# Patient Record
Sex: MALE | Race: WHITE
[De-identification: names, ages, dates, MRNs, and addresses within clinical notes are randomized per-mention and may not be internally consistent; named-entity substitution may affect disease eponyms.]

---

## 2017-01-26 ENCOUNTER — HOSPITAL ENCOUNTER (OUTPATIENT)
Dept: HOSPITAL 39 - CT | Age: 82
End: 2017-01-26
Attending: FAMILY MEDICINE
Payer: MEDICARE

## 2017-01-26 DIAGNOSIS — S20.211A: ICD-10-CM

## 2017-01-26 DIAGNOSIS — S22.41XA: ICD-10-CM

## 2017-01-26 DIAGNOSIS — N20.0: ICD-10-CM

## 2017-01-26 DIAGNOSIS — I70.90: ICD-10-CM

## 2017-01-26 DIAGNOSIS — I51.7: ICD-10-CM

## 2017-01-26 DIAGNOSIS — I31.3: ICD-10-CM

## 2017-01-26 DIAGNOSIS — S06.0X0A: Primary | ICD-10-CM

## 2017-01-26 NOTE — CT
Study: CT Chest.



Indication: CONTUSION RT FRONTAL WALL OF THORAX



Technique: CT imaging of the chest obtained without intravenous

administration of contrast.



Comparison: None.



Findings:



Atherosclerosis great vessels, aorta, coronary arteries. Moderate

cardiomegaly. Small pericardial effusion.  No pathologically enlarged

mediastinal or hilar lymphadenopathy in the absence of intravenous

contrast.



Anterior right 2nd through 10th rib fractures noted with changes most

pronounced at the 5th through 7th rib fractures where there is

significant comminution and buckling. Overlying the site there is

subcutaneous edema/ inflammation and mild chest wall hematoma. In

addition, there is minimal emphysema within the anterior chest wall

musculature at this site.



No consolidation, pleural effusion or pneumothorax. Mild bibasilar

atelectasis. Minimal indeterminate fluid noted anterior to the right

renal vein. Partial visualization of a 3 mm central nonobstructing right

renal calculus. Left kidney not visualized.



Impression:



Multiple right anterior rib fractures as above with associated soft

tissue injury of the right chest wall.



No pulmonary contusion or pneumothorax.



Atherosclerosis.



Cardiomegaly with a small pericardial effusion. Echocardiography could

better evaluate as clinically indicated.



Minimal indeterminate fluid along the anterior margin right renal vein.



Partial visualization 3 mm right renal stone.



Electronically signed by: Renny Davis MD 01/26/2017 12:38

## 2017-01-26 NOTE — CT
Study:  CT of the Head.



Indication: HEAD INJURY



Technique:  Axial CT images of the head were acquired without intravenous

contrast.



Comparison: None.



Findings:



No CT evidence of acute ischemia, acute hemorrhage, mass, mass effect,

midline shift, or extra-axial fluid collection.



Ventricles are normal in configuration without hydrocephalus.



 Patchy hypoattenuation of the periventricular and subcortical white

matter noted. This is nonspecific but most consistent with chronic

microvascular ischemic change. Global parenchymal volume loss and

intracranial atherosclerosis noted as well.



Paranasal sinuses are adequately aerated.



Mastoid air cells are adequately aerated.



Osseous structures and soft tissues are unremarkable.



Impression:

1. No CT evidence of acute intracranial abnormality.

2. Senescent changes.





Electronically signed by: Renny Davis MD 01/26/2017 12:33

## 2017-04-18 ENCOUNTER — HOSPITAL ENCOUNTER (OUTPATIENT)
Dept: HOSPITAL 39 - US | Age: 82
Discharge: HOME | End: 2017-04-18
Attending: FAMILY MEDICINE
Payer: MEDICARE

## 2017-04-18 DIAGNOSIS — M79.604: Primary | ICD-10-CM

## 2017-04-19 NOTE — US
EXAM DESCRIPTION: 



Venous,Lower Extremity RT



CLINICAL HISTORY: 



81 years, Male, PAIN IN RIGHT LEG



COMPARISON: 



None



TECHNIQUE: 



Duplex venous ultrasound of the right lower extremity was

performed.



FINDINGS:

 

The right  lower extremity veins are fully compressible and

demonstrate physiologic responses to Valsalva maneuvers. Color

Doppler images show no intraluminal filling defect.



IMPRESSION: 



Negative exam. No evidence of DVT in the right lower extremity.



Electronically signed by:  Harsh Thorne MD  4/19/2017 7:50 AM CDT

## 2017-05-02 ENCOUNTER — HOSPITAL ENCOUNTER (OUTPATIENT)
Dept: HOSPITAL 39 - GMAB | Age: 82
Discharge: HOME | End: 2017-05-02
Attending: FAMILY MEDICINE
Payer: MEDICARE

## 2017-05-02 DIAGNOSIS — I10: ICD-10-CM

## 2017-05-02 DIAGNOSIS — Z12.5: Primary | ICD-10-CM

## 2017-05-02 PROCEDURE — 84443 ASSAY THYROID STIM HORMONE: CPT

## 2017-07-11 ENCOUNTER — HOSPITAL ENCOUNTER (OUTPATIENT)
Dept: HOSPITAL 39 - GMAB | Age: 82
Discharge: HOME | End: 2017-07-11
Attending: FAMILY MEDICINE
Payer: MEDICARE

## 2017-07-11 DIAGNOSIS — R41.9: Primary | ICD-10-CM

## 2017-07-11 DIAGNOSIS — R41.81: ICD-10-CM

## 2017-07-14 ENCOUNTER — HOSPITAL ENCOUNTER (OUTPATIENT)
Dept: HOSPITAL 39 - MRI | Age: 82
End: 2017-07-14
Attending: FAMILY MEDICINE
Payer: MEDICARE

## 2017-07-14 DIAGNOSIS — R41.81: Primary | ICD-10-CM

## 2017-07-14 DIAGNOSIS — R41.9: ICD-10-CM

## 2017-09-02 ENCOUNTER — HOSPITAL ENCOUNTER (EMERGENCY)
Dept: HOSPITAL 39 - ER | Age: 82
Discharge: HOME | End: 2017-09-02
Payer: MEDICARE

## 2017-09-02 VITALS — TEMPERATURE: 98.7 F | OXYGEN SATURATION: 99 %

## 2017-09-02 VITALS — SYSTOLIC BLOOD PRESSURE: 116 MMHG | DIASTOLIC BLOOD PRESSURE: 74 MMHG

## 2017-09-02 DIAGNOSIS — Z79.01: ICD-10-CM

## 2017-09-02 DIAGNOSIS — K21.9: ICD-10-CM

## 2017-09-02 DIAGNOSIS — S61.511A: Primary | ICD-10-CM

## 2017-09-02 DIAGNOSIS — I10: ICD-10-CM
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
## 2017-09-02 NOTE — ED.PDOC
History of Present Illness





- General


Chief Complaint: Laceration


Stated Complaint: cut R wrist


Time Seen by Provider: 17 13:31


Source: patient, RN notes reviewed, Vital Signs reviewed


Exam Limitations: no limitations





- History of Present Illness


Initial Comments: 





Patient comes in with a cut on his right wrist. He was attempting to cut wild 

flowers for his wife and accidentally cut his wrist. He is on Warfarin and thus 

had a lot of bleeding. No numbness or tingling distally. Moves all fingers.


Timing/Duration: just prior to arrival


Severity: moderate


Location: extremities - Right volar wrist


Improving Factors: nothing


Worsening Factors: nothing


Associated Symptoms: denies symptoms


Allergies/Adverse Reactions: 


Allergies





NO KNOWN ALLERGY Allergy (Unverified 04/09/15 20:31)


 








Home Medications: 


Ambulatory Orders





Finasteride 5 mg PO DAILY 04/09/15 


Meloxicam 7.5 mg PO DAILY 04/09/15 


Metoprolol Tartrate 50 PO DAILY 04/09/15 


Protonix 40 mg PO DAILY 04/09/15 


Simvastatin 40 mg PO DAILY 04/09/15 


Sulfamethoxazole-Trimethoprim [Bactrim Ds 800-160 mg] 1 tab PO BID #10 tab 04/09

/15 


Tamsulosin Hcl 0.4 mg PO DAILY 04/09/15 











Review of Systems





- Review of Systems


Constitutional: States: no symptoms reported


Respiratory: States: no symptoms reported


Cardiology: States: no symptoms reported


Musculoskeletal: States: no symptoms reported


Skin: States: see HPI


Neurological: States: no symptoms reported.  Denies: numbness, paresthesia, 

tingling, weakness


Hematologic/Lymphatic: States: easy bleeding


All other Systems: No Change from Baseline





Past Medical History (General)





- Patient Medical History


Hx Hypertension: Yes


Hx Gastroesophageal Reflux: Yes


Hx Cancer: No





- Vaccination History


Hx Tetanus, Diphtheria Vaccination: No


Hx Influenza Vaccination: Yes


Hx Pneumococcal Vaccination: No





- Social History


Hx Tobacco Use: No


Hx Chewing Tobacco Use: No


Hx Alcohol Use: No


Hx Substance Use: No


Hx Substance Use Treatment: No


Hx Depression: No


Hx Physical Abuse: No


Hx Emotional Abuse: No


Hx Suspected Abuse: No





- Female History


Patient Pregnant: No





Family Medical History





- Family History


  ** Mother


Living Status: 


Hx Family Cancer: Yes





Physical Exam





- Physical Exam


General Appearance: Alert, Comfortable, No apparent distress, Well Developed, 

Well Groomed, Well Hydrated, Well Nourished


Respiratory: no respiratory distress


Extremity: normal range of motion, no pedal edema, normal capillary refill


Neurologic: no motor/sensory deficits - L hand - normal sensation to light touch

, FROM all fingers, alert, normal mood/affect, oriented x 3


Skin Exam: warm/dry, normal color


Skin Problem Location: upper extremities - R volar wrist


Skin Character: other - ~5cm laceration on volar aspect of R wrist. Tendon is 

visable but no injury. Moderate bleeding


Comments: 





 Vital Signs











  17





  12:26


 


Temperature 98.7 F


 


Pulse Rate [ 74





Left Radial] 


 


Respiratory 18





Rate 


 


Blood Pressure 110/72





[Left Arm] 


 


O2 Sat by Pulse 99





Oximetry 














Procedures





- Laceration/Wound Repair


  ** Right Volar Wrist


Wound Length (cm): 5


Wound's Depth, Shape: superficial, linear


Wound Explored: no foreign body removed


Irrigated w/ Saline (cc's): 250


Betadine Prep?: Yes - then scrubbed with Hibiclens and saline


Anesthesia: 1% Lidocaine


Volume Anesthetic (cc's): 3


Wound Debrided: minimal


Wound Repaired With: sutures


Suture Size/Type: 4:0, prolene


Number of Sutures: 8


Layer Closure?: No


Sterile Dressing Applied?: Yes - Pressure dressing with Neosporin


Splint Applied?: No


Sling Applied?: No





Departure





- Departure


Clinical Impression: 


Laceration of right wrist without complication


Qualifiers:


 Encounter type: initial encounter Qualified Code(s): S61.511A - Laceration 

without foreign body of right wrist, initial encounter





Time of Disposition: 13:38


Disposition: Discharge to Home or Self Care


Condition: Good


Departure Forms:  ED Discharge - Pt. Copy, Patient Portal Self Enrollment


Instructions:  DI for Laceration Repair -- Simple


Diet: resume usual diet


Activity: increase activity as tolerated


Referrals: 


Abdirizak Gurrola MD [Primary Care Provider] - 1-2 Weeks


Home Medications: 


Ambulatory Orders





Finasteride 5 mg PO DAILY 04/09/15 


Meloxicam 7.5 mg PO DAILY 04/09/15 


Metoprolol Tartrate 50 PO DAILY 04/09/15 


Protonix 40 mg PO DAILY 04/09/15 


Simvastatin 40 mg PO DAILY 04/09/15 


Sulfamethoxazole-Trimethoprim [Bactrim Ds 800-160 mg] 1 tab PO BID #10 tab 04/09

/15 


Tamsulosin Hcl 0.4 mg PO DAILY 04/09/15 








Additional Instructions: 


Suture removal in 7-10 days

## 2017-11-29 ENCOUNTER — HOSPITAL ENCOUNTER (OUTPATIENT)
Dept: HOSPITAL 39 - GMAB | Age: 82
Discharge: HOME | End: 2017-11-29
Attending: FAMILY MEDICINE
Payer: MEDICARE

## 2017-11-29 DIAGNOSIS — M48.07: Primary | ICD-10-CM

## 2017-11-29 DIAGNOSIS — R42: ICD-10-CM

## 2017-12-01 ENCOUNTER — HOSPITAL ENCOUNTER (EMERGENCY)
Dept: HOSPITAL 39 - ER | Age: 82
Discharge: HOME | End: 2017-12-01
Payer: MEDICARE

## 2017-12-01 VITALS — SYSTOLIC BLOOD PRESSURE: 118 MMHG | DIASTOLIC BLOOD PRESSURE: 60 MMHG

## 2017-12-01 VITALS — TEMPERATURE: 98.1 F

## 2017-12-01 VITALS — OXYGEN SATURATION: 96 %

## 2017-12-01 DIAGNOSIS — I10: ICD-10-CM

## 2017-12-01 DIAGNOSIS — Z79.899: ICD-10-CM

## 2017-12-01 DIAGNOSIS — Q60.0: ICD-10-CM

## 2017-12-01 DIAGNOSIS — I48.91: ICD-10-CM

## 2017-12-01 DIAGNOSIS — K21.9: ICD-10-CM

## 2017-12-01 DIAGNOSIS — R42: Primary | ICD-10-CM

## 2017-12-01 DIAGNOSIS — Z79.01: ICD-10-CM

## 2017-12-01 NOTE — CT
EXAM DESCRIPTION: 

Head



CLINICAL HISTORY: 

slurred speech/dizzy



COMPARISON: 

January 26, 2017



TECHNIQUE: 

Noncontrast transaxial CT images of the head are obtained from

base to vertex. 

This exam was performed according to our departmental

dose-optimization program, which includes automated exposure

control, adjustment of the mA and/or kV according to patient size

and/or use of iterative reconstruction technique.



FINDINGS: 

The midline structures are not displaced. Sulci are

age-appropriate. There are areas of decreased attenuation in the

periventricular white matter and the white matter of the centrum

semiovale.

Focal area of decreased attenuation inferior to the left basal

ganglia could represent old lacunar infarct versus prominent

perivascular space.

There is no evidence of mass, mass-effect, hydrocephalus, or

acute intracranial hemorrhage. No abnormal extra axial fluid

collection is seen.

Bone windows show no evidence of depressed skull fracture.

Moderate calcifications of the intracranial carotid arteries is

seen.

The visualized paranasal sinuses are unremarkable.



IMPRESSION: 

1.  Age-appropriate atrophy with evidence of old small vessel

ischemic type changes seen.

2.  No acute abnormality is seen on noncontrast CT of the head.



Electronically signed by:  Zeus Gilliam MD  12/1/2017 11:54 AM [facility]

Workstation: [id number]

## 2017-12-01 NOTE — ED.PDOC
History of Present Illness





- General


Chief Complaint: Neuro Symptoms/Deficits


Stated Complaint: slurred speech,dizziness


Time Seen by Provider: 17 10:50


Source: patient


Exam Limitations: no limitations





- History of Present Illness


Initial Comments: 





Juan Francisco 82 y/o male stated he had on and off slurred speech for the last 

one week and occasional dizzy feeling denies any localized weakness on his 

extremities or facial palsy;has A.fib and had been on long term 

anticoagulation.No tinnitus ,wears hearing aids,no blurry vision,no nausea/

vomiting


Timing/Duration: changing over time, intermittent, other - 6 days


Severity: moderate


Improving Factors: nothing


Worsening Factors: nothing


Associated Symptoms: denies symptoms


Allergies/Adverse Reactions: 


Allergies





NO KNOWN ALLERGY Allergy (Unverified 04/09/15 20:31)


 








Home Medications: 


Ambulatory Orders





Celecoxib [Celebrex] 200 mg PO DAILY 17 


Clonazepam 0.5 mg PO PRN 17 


Donepezil Hydrochloride [Aricept] 10 mg PO BEDTIME 17 


Finasteride [Proscar] 5 mg PO DAILY 17 


Folic Acid-Vitamin B6-Vitamin [Folbee] 1 tab PO DAILY 17 


Glucosamine Hydrochloride [Glucosamine] 500 mg PO DAILY 17 


Memantine HCl [Namenda] 10 mg PO BID 17 


Metoprolol Succinate [Metoprolol Succinate ER] 100 mg PO DAILY 17 


Misc Natural Products [Saw [place]] 1 cap PO DAILY 17 


Multiple Vitamins W/ Minerals [Multivitamin Adults] 1 tab PO DAILY 17 


Pantoprazole Tablet [Protonix] 40 mg PO ACBK 17 


Pregabalin [Lyrica] 50 mg PO DAILY 17 


Sildenafil Citrate [Viagra] 50 mg PO PRN 17 


Simvastatin [Zocor] 20 mg PO DAILY 17 


Tamsulosin [Flomax] 0.4 mg PO BEDTIME 17 


Warfarin Sodium [Coumadin] 10 mg PO DAILY 17 


Zolpidem Tartrate [Ambien] 5 mg PO PRN 17 











Review of Systems





- Review of Systems


Constitutional: States: no symptoms reported


EENTM: States: no symptoms reported


Respiratory: States: no symptoms reported


Cardiology: States: no symptoms reported


Gastrointestinal/Abdominal: States: no symptoms reported


Genitourinary: States: no symptoms reported


Musculoskeletal: States: no symptoms reported


Neurological: States: see HPI





Past Medical History (General)





- Patient Medical History


Hx Stroke: No


Hx Cardiac Disorders: Yes - Atrial fib


Hx Congestive Heart Failure: No


Hx Hypertension: Yes


Hx Diabetes: No


Hx Gastroesophageal Reflux: Yes


Hx Cancer: No


Hx Other PMH: Yes - inborn solitary kidney


Surgical History: tonsillectomy, other - knee,back





- Vaccination History


Hx Tetanus, Diphtheria Vaccination: No


Hx Influenza Vaccination: Yes


Hx Pneumococcal Vaccination: Yes





- Social History


Hx Tobacco Use: No


Hx Chewing Tobacco Use: No


Hx Alcohol Use: No


Hx Substance Use: No


Hx Substance Use Treatment: No


Hx Depression: No


Hx Physical Abuse: No


Hx Emotional Abuse: No


Hx Suspected Abuse: No





- Activities of Daily Living


Grooming Ability: Independent


Eating (Feeding) Ability: Independent


Toileting Ability: Independent





- Female History


Patient Pregnant: No





Family Medical History





- Family History


  ** Mother


Living Status: 


Hx Family Asthma: Yes - copd-dad


Hx Family Stroke: Yes - mom-but  in her old age


Hx Family Cancer: Yes





Physical Exam





- Physical Exam


General Appearance: Alert, Comfortable, No apparent distress


Eye Exam: bilateral normal


Ears, Nose, Throat: hearing grossly normal, normal ENT inspection, normal 

pharynx


Neck: non-tender, full range of motion, supple, normal inspection


Respiratory: chest non-tender, lungs clear, normal breath sounds, no 

respiratory distress


Cardiovascular/Chest: normal peripheral pulses, no murmur, irregularly 

irregular - heart rate-65


Peripheral Pulses: radial,right: 2+, radial,left: 2+


Gastrointestinal/Abdominal: normal bowel sounds, non tender, soft, no 

organomegaly


Back Exam: no CVA tenderness, no vertebral tenderness


Extremity: normal range of motion, non-tender, no pedal edema, no calf 

tenderness


Neurologic: CNs II-XII nml as tested, no motor/sensory deficits, alert, 

oriented x 3, other - negative pronator drift;Romberg negative


Skin Exam: warm/dry


Lymphatic: no adenopathy





Progress





- Progress


Progress: 





17 12:17


 Last Vital Signs











Temp  98.1 F   17 10:47


 


Pulse  71   17 11:57


 


Resp  16   17 11:57


 


BP  119/68   17 11:57


 


Pulse Ox  96   17 11:57














- Results/Orders


Results/Orders: 





 Laboratory Tests











  17





  11:33 11:33 11:33


 


WBC  7.0  


 


RBC  4.67 L  


 


Hgb  13.1 L  


 


Hct  39.9 L  


 


MCV  85.3  


 


MCH  28.0  


 


MCHC  33.0  


 


RDW  16.2 H  


 


Plt Count  167  


 


MPV  7.8  


 


Absolute Neuts (auto)  5.00  


 


Absolute Lymphs (auto)  1.20  


 


Absolute Monos (auto)  0.60  


 


Absolute Eos (auto)  0.10  


 


Absolute Basos (auto)  0.10  


 


Neutrophils %  71.2  


 


Lymphocytes %  17.9 L  


 


Monocytes %  8.0  


 


Eosinophils %  2.0  


 


Basophils %  0.9  


 


PT   25.3 H* 


 


INR   2.260 


 


Sodium    137


 


Potassium    4.2


 


Chloride    104


 


Carbon Dioxide    26


 


Anion Gap    11.2 L


 


BUN    20 H


 


Creatinine    1.23


 


BUN/Creatinine Ratio    16.3


 


Random Glucose    93


 


Serum Osmolality    276.1


 


Calcium    9.1


 


Total Bilirubin    0.9


 


AST    28


 


ALT    27


 


Alkaline Phosphatase    54


 


Serum Total Protein    7.0


 


Albumin    4.2


 


Globulin    2.8


 


Albumin/Globulin Ratio    1.5














- EKG/XRAY/CT


EKG: Atrial, Fibrillation


Comments: heart rate -88


CT Ordered: Yes - head -no acute abnormality





Departure





- Departure


Clinical Impression: 


 Dizziness, nonspecific





Time of Disposition: 12:19


Disposition: Discharge to Home or Self Care


Condition: Fair


Departure Forms:  ED Discharge - Pt. Copy, Patient Portal Self Enrollment


Instructions:  DI for Stroke-Intracerebral Hemorrhage


Referrals: 


Abdirizak Gurrola MD [Primary Care Provider] - 1-2 Weeks


Home Medications: 


Ambulatory Orders





Celecoxib [Celebrex] 200 mg PO DAILY 17 


Clonazepam 0.5 mg PO PRN 17 


Donepezil Hydrochloride [Aricept] 10 mg PO BEDTIME 17 


Finasteride [Proscar] 5 mg PO DAILY 17 


Folic Acid-Vitamin B6-Vitamin [Folbee] 1 tab PO DAILY 17 


Glucosamine Hydrochloride [Glucosamine] 500 mg PO DAILY 17 


Memantine HCl [Namenda] 10 mg PO BID 17 


Metoprolol Succinate [Metoprolol Succinate ER] 100 mg PO DAILY 17 


Misc Natural Products [Saw [place]] 1 cap PO DAILY 17 


Multiple Vitamins W/ Minerals [Multivitamin Adults] 1 tab PO DAILY 17 


Pantoprazole Tablet [Protonix] 40 mg PO ACBK 17 


Pregabalin [Lyrica] 50 mg PO DAILY 17 


Sildenafil Citrate [Viagra] 50 mg PO PRN 17 


Simvastatin [Zocor] 20 mg PO DAILY 17 


Tamsulosin [Flomax] 0.4 mg PO BEDTIME 17 


Warfarin Sodium [Coumadin] 10 mg PO DAILY 17 


Zolpidem Tartrate [Ambien] 5 mg PO PRN 17 








Additional Instructions: 


RETURN TO EMERGENCY ROOM AS NEEDED;FOLLOW UP WITH primary md 2017 call 

for appointment if needed

## 2017-12-04 ENCOUNTER — HOSPITAL ENCOUNTER (OUTPATIENT)
Dept: HOSPITAL 39 - MRI | Age: 82
Discharge: HOME | End: 2017-12-04
Attending: FAMILY MEDICINE
Payer: MEDICARE

## 2017-12-04 DIAGNOSIS — M48.07: Primary | ICD-10-CM

## 2017-12-04 DIAGNOSIS — R55: ICD-10-CM

## 2017-12-04 NOTE — MRI
EXAM DESCRIPTION: 



Lumbar Spine w/o Contrast



CLINICAL HISTORY: 



81 years, Male, SPINAL STENOSIS- LUMBOSACRAL REGION



COMPARISON: 



None



TECHNIQUE: 



Multiplanar multi sequence images of the lumbar spine were

obtained without gadolinium contrast.



FINDINGS: 



Vertebral body height and alignment are well maintained. There

are Modic type I discogenic endplate signal changes at L1-2.

Modic type II changes are present at L3-4 and L4-5. The conus

lies posterior to the T12 body, and the cauda equina is

unremarkable. The paraspinal and visualized retroperitoneal soft

tissues are unremarkable. There is disc desiccation throughout

the lumbar spine.



At L1-2, there is a small Schmorl's node in the inferior endplate

of L1. Disc desiccation with concentric disc bulging, bilateral

facet joint degeneration and ligament flavum thickening resulting

in moderate central canal and advanced bilateral neuroforaminal

stenosis, worse on the left side. Disc material approaches and

possibly abuts the exiting L1 nerve roots bilaterally. 

At L2-3, there is mild concentric disc bulging with bilateral

facet joint degeneration and ligament flavum thickening, but no

central canal or neuroforaminal stenosis.

At L3-4, there is concentric disc bulging with bilateral facet

joint degeneration and ligament flavum thickening resulting in

mild to moderate central canal and moderately advanced bilateral

neuroforaminal stenosis. Disc material approaches and

questionably abuts the L4 nerve roots in the lateral recesses

bilaterally.

At L4-5, there is concentric disc bulging with right-sided facet

joint degeneration resulting in moderate bilateral neuroforaminal

stenosis. Disc material approaches and possibly abuts the exiting

L4 nerve roots bilaterally. Disc material approaches the L5 nerve

roots in the lateral recesses bilaterally without definite

abutment.

At L5-S1, there is only mild concentric disc bulging, worse in

the left lateral position. Mild bilateral facet joint

degeneration, but no central canal or neuroforaminal stenosis.



IMPRESSION: 



Moderately advanced multilevel degenerative changes including

central canal stenosis, neuroforaminal stenosis and possible

nerve root abutment as detailed above. Overall, findings are

worse at L1-2, L3-4 and L4-5.



Electronically signed by:  Harsh Thorne MD  12/4/2017 11:30 AM CST

Workstation: [id number]

## 2018-01-11 ENCOUNTER — HOSPITAL ENCOUNTER (OUTPATIENT)
Dept: HOSPITAL 39 - CT | Age: 83
Discharge: HOME | End: 2018-01-11
Attending: PHYSICAL MEDICINE & REHABILITATION
Payer: MEDICARE

## 2018-01-11 DIAGNOSIS — G91.1: Primary | ICD-10-CM

## 2018-01-12 ENCOUNTER — HOSPITAL ENCOUNTER (OUTPATIENT)
Dept: HOSPITAL 39 - CT | Age: 83
Discharge: HOME | End: 2018-01-12
Attending: FAMILY MEDICINE
Payer: MEDICARE

## 2018-01-12 DIAGNOSIS — R31.9: Primary | ICD-10-CM

## 2018-01-12 NOTE — CT
EXAM DESCRIPTION: 

Abdomen/Pelvis w/wo Contrast: CT.



CLINICAL HISTORY: 

HEMATURIA



COMPARISON: 

CT scan of the head one day earlier. CT scan of the chest

1/26/2017. Ultrasound abdomen 3/21/2008.



TECHNIQUE: 

Spiral-axial scans at 5.0 mm intervals through the abdomen and

pelvis before and after standard dose nonionic IV contrast. No

oral contrast. Coronal and sagittal 2.0 mm reconstructions.   No

adverse reactions.  Total Exam DLP 1555.09 mGy - cm.  This exam

was performed according to our departmental CT dose-optimization

program which includes automated exposure control, adjustment of

the mA and/or kV according to patient size and/or use of

iterative reconstruction technique; to reduce radiation dose to

as low as reasonably achievable (ALARA).



FINDINGS: 

Kidneys and Ureters: Perinephric stranding right kidney. 2 mm

radiodense stone in the upper collecting system. Minimal

hydronephrosis inferior collecting system, trace in upper kidney.

2.5 cm low-density nonenhancing cyst posterior upper lobe with

Hounsfield density +21. Smaller cysts upper and lower pole. 5.5

mm radiodense stone in the mid ureter just anterior to the

bifurcation of the right common iliac artery. Minimal dilation of

the ureter at this level with periureteral edema. Postcontrast

scans show contrast in the right ureter proximal to this stone

along with dilation and soft tissue edema. 2.5 mm radiodense

stone fragment or contrast distal right ureter approximately 2 cm

from the UVJ. Ureter not distended distal to the stone and

contrast. Left kidney is surgically absent. Surgical clips in the

left renal fossa. No mass or fluid..

Pelvic Organs: Marked enlargement of the prostate gland with

inferior central and peripheral calcifications. Mass effect on

the urinary bladder and seminal vesicles with smooth margins.

Transverse diameter 6.2 x 5.6 cm. 6.2 cm craniocaudal. No free

fluid or enlarged lymph nodes.

Lung bases and pleura: Negative.

Liver, Stomach, Spleen, Adrenal Glands: No focal lesions in the

liver. Minimal dilation of the intrahepatic ducts and common

hepatic duct. Left adrenal gland surgically absent. Other organs

negative.

Pancreas, Gallbladder, Ducts: Gallbladder is visualized. Normal

appearance of the common bile duct and pancreas.

Mesentery: Right pararenal stranding and minimal stranding around

the distal mid right ureteral stone and ureter. No free fluid.

Aorta: Moderate atherosclerotic calcification mid and distal.

Normal caliber of the outer wall. Atherosclerotic calcification

of the ostia of the celiac axis and right single renal artery.

Calcification also the bilateral proximal common iliac arteries.

Small Bowel: Negative.

Terminal Ileum/Cecum: Unremarkable, including appendix. 

Colon: Normal caliber with diffuse fecal material. Minimal

redundancy of the sigmoid colon with diverticula but no

complications.

Spine and Bony Pelvis: Spondylosis and extreme disc space

narrowing L3-4 and L4-5 with narrowing of the canal and foramina.

Also spondylosis disc space narrowing and foraminal and canal

narrowing at L1-2. Spondylosis in the mid and lower thoracic

spine. Minimal thoracal lumbar dextroscoliosis. Subchondral cyst

or radiolucency superior lateral right acetabulum with joint

space narrowing.

Abdominal Wall/Back Soft Tissues: Small left inguinal fatty

hernia not containing bowel.



IMPRESSION: 

1. 5.5 mm partially obstructing stone in the distal mid right

ureter at the sacral level with ureteral dilation and

periureteral edema. Mild hydronephrosis right kidney. Right renal

cysts and minimal perinephric stranding. No fluid or recurrent

mass in the left renal fossa.

2. No radiodense stones in the urinary bladder but significant

mass effect from enlarged prostate gland. If not previously

performed, consider serum PSA determination.

3. Diverticulosis of the distal colon but no diverticulitis.

4. Multiple levels of spondylosis in the lumbar spine and canal

and foraminal narrowing. Spondylosis also in the thoracic spine.

Small left inguinal fatty hernia not containing bowel. Moderate

atherosclerotic calcification in the aorta with distal luminal

narrowing.



Electronically signed by:  Ellis Amaya MD  1/12/2018 12:58 PM

CST Workstation: [id number]

## 2018-01-12 NOTE — CT
EXAM DESCRIPTION: 

Head: Computed Tomography.



CLINICAL HISTORY: 

OBSTRUCTIVE HYDROCEPHALUS



COMPARISON:

CT head 12/1/2017



TECHNIQUE: 

Non-helical axial scans through the skull and brain, at 5.0 mm

intervals, non-contrast. Coronal and sagittal 2.0 mm

reconstructions.  Total Exam DLP: 677.23 mGy-cm.  This exam was

performed according to our departmental dose-optimization program

which includes automated exposure control, adjustment of the mA

and/or kV according to patient size and/or use of iterative

reconstruction technique; to reduce radiation dose to as low as

reasonably achievable (ALARA).



FINDINGS: 

No hemorrhage, no mass-effect, and no midline shift. Minimal

bilateral periventricular low-density in the white matter and

centrum semiovale is symmetric. Again noted is small focal area

of low-density in the posterior left basal ganglia near the

external capsule. Vascular calcifications anterior circulation;

physiologic calcifications in the pineal gland and choroid

plexus. 



No effacement or displacement of the ventricles, CSF spaces, or

subdural spaces. Age-appropriate. No extra axial fluid collection

or hemorrhage. No gross abnormalities of the bony calvarium.

Included paranasal sinuses and mastoid air cells are well -

aerated.



IMPRESSION: 

1. No hemorrhage, no mass effect, no midline shift. Bilateral

periventricular white matter and centrum semiovale cerebral

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 Prominent

perivascular space versus old infarct posterior left basal

ganglia is also unchanged.

2. Ventricle CSF spaces and subdural spaces and basilar cisterns

are age-appropriate. No evidence of obstructive hydrocephalus.



Electronically signed by:  Ellis Amaya MD  1/12/2018 10:33 AM

CST Workstation: [id number]

## 2018-05-09 ENCOUNTER — HOSPITAL ENCOUNTER (OUTPATIENT)
Dept: HOSPITAL 39 - GMAB | Age: 83
End: 2018-05-09
Attending: FAMILY MEDICINE
Payer: MEDICARE

## 2018-05-09 DIAGNOSIS — E03.9: Primary | ICD-10-CM

## 2018-05-09 DIAGNOSIS — Z12.5: ICD-10-CM

## 2018-05-09 PROCEDURE — 84443 ASSAY THYROID STIM HORMONE: CPT

## 2018-05-09 PROCEDURE — 84439 ASSAY OF FREE THYROXINE: CPT

## 2018-05-09 PROCEDURE — 84481 FREE ASSAY (FT-3): CPT

## 2018-05-11 ENCOUNTER — HOSPITAL ENCOUNTER (EMERGENCY)
Dept: HOSPITAL 39 - ER | Age: 83
Discharge: HOME | End: 2018-05-11
Payer: MEDICARE

## 2018-05-11 VITALS — SYSTOLIC BLOOD PRESSURE: 108 MMHG | DIASTOLIC BLOOD PRESSURE: 55 MMHG | OXYGEN SATURATION: 98 %

## 2018-05-11 DIAGNOSIS — I10: ICD-10-CM

## 2018-05-11 DIAGNOSIS — E78.00: ICD-10-CM

## 2018-05-11 DIAGNOSIS — R11.10: ICD-10-CM

## 2018-05-11 DIAGNOSIS — Z79.899: ICD-10-CM

## 2018-05-11 DIAGNOSIS — I48.2: ICD-10-CM

## 2018-05-11 DIAGNOSIS — R07.89: Primary | ICD-10-CM

## 2018-05-11 DIAGNOSIS — K21.9: ICD-10-CM

## 2018-05-11 NOTE — ED.PDOC
History of Present Illness





- General


Chief Complaint: Chest Pain/MI


Time Seen by Provider: 18 11:16


Source: patient, family


Exam Limitations: no limitations


Additional Information: 





PT C/O L SIDED CP. SHARP, NO RADIATION, AT REST, LASTED APPROX 5-10 SECONDS. 

NAUSEA, NO SOB/DIAPHORESIS. PT RECENTLY HAD STRESS WHICH SHOWED POSSIBLE 

ISCHEMIA IS DUE FOR SECOND STRESS 





- History of Present Illness


Timing/Duration: other - BRIEF


Severity: mild


Location: other - L CHEST


Activities at Onset: none


Improving Factors: nothing


Worsening Factors: nothing


Allergies/Adverse Reactions: 


Allergies





NO KNOWN ALLERGY Allergy (Verified 18 11:09)


 








Home Medications: 


Ambulatory Orders





Celecoxib [Celebrex] 200 mg PO DAILY 17 


Clonazepam 0.5 mg PO PRN 17 


Donepezil Hydrochloride [Aricept] 10 mg PO BEDTIME 17 


Finasteride [Proscar] 5 mg PO DAILY 17 


Folic Acid-Vitamin B6-Vitamin [Folbee] 1 tab PO DAILY 17 


Glucosamine Hydrochloride [Glucosamine] 500 mg PO DAILY 17 


Misc Natural Products [Saw [place]] 1 cap PO DAILY 17 


Simvastatin [Zocor] 20 mg PO DAILY 17 


Apixaban [Eliquis] 2.5 mg PO DAILY 18 


Citalopram Hydrobromide [Citalopram] 20 mg PO DAILY 18 


Gabapentin 300 mg PO TID PRN 18 


Latanoprost [Latanoprost] 0.005 % BOTH_EYES BEDTIME 18 


Memantine HCl-Donepezil HCl [Namzaric 21-10 mg] 1 cap PO BEDTIME 18 


Metoprolol Tartrate 25 mg PO DAILY 18 


Timolol Maleate (Ophth) [Timolol Maleate] 0.5 % BOTH_EYES DAILY 18 


Tramadol HCl 50 mg PO Q6H PRN 18 











Review of Systems





- Review of Systems


Constitutional: States: no symptoms reported


EENTM: States: no symptoms reported


Respiratory: Denies: cough, orthopnea, short of breath


Cardiology: States: chest pain.  Denies: palpitations, syncope


Gastrointestinal/Abdominal: States: nausea.  Denies: abdominal pain, diarrhea, 

vomiting


Genitourinary: States: no symptoms reported


Musculoskeletal: States: no symptoms reported


Skin: States: no symptoms reported, other - NO DIAPHORESIS


Neurological: States: no symptoms reported


Endocrine: States: no symptoms reported


Hematologic/Lymphatic: States: no symptoms reported





Past Medical History (General)





- Patient Medical History


Hx Stroke: No


Hx Cardiac Disorders: Yes - Hypercholesterolemia, CHRONIC A FIB


Hx Congestive Heart Failure: No


Hx Hypertension: Yes


Hx Diabetes: No


Hx Gastroesophageal Reflux: Yes


Hx Cancer: No


Surgical History: no surgical history





- Vaccination History


Hx Tetanus, Diphtheria Vaccination: No


Hx Influenza Vaccination: Yes - 2017


Hx Pneumococcal Vaccination: Yes





- Social History


Hx Tobacco Use: No


Hx Chewing Tobacco Use: No


Hx Alcohol Use: No


Hx Substance Use: No


Hx Substance Use Treatment: No


Hx Depression: No


Hx Physical Abuse: No


Hx Emotional Abuse: No


Hx Suspected Abuse: No





- Female History


Patient Pregnant: No





Family Medical History





- Family History


  ** Mother


Living Status: 


Hx Family Asthma: Yes - copd-dad


Hx Family Stroke: Yes - mom-but  in her old age


Hx Family Cancer: Yes





Physical Exam





- Physical Exam


General Appearance: Alert, No apparent distress


Eyes, Ears, Nose, Throat Exam: PERRL/EOMI, normal ENT inspection, other - NERY 

HEARING AIDS


Neck: non-tender, full range of motion, supple


Respiratory: lungs clear, normal breath sounds


Cardiovascular/Chest: irregularly irregular - NL RATE


Gastrointestinal/Abdominal: non tender, soft, no organomegaly


Extremity: normal range of motion, non-tender, normal inspection


Neurologic: alert, normal mood/affect


Skin Exam: normal color, warm/dry


Lymphatic: no adenopathy





Progress





- Progress


Progress: 





18 12:17


RECOMMENDED OBS FOR R/O, PT REFUSES BUT IS WILLING TO STAY FOR 3 HOUR REPEAT. 





- EKG/XRAY/CT


XRAY: chest - ANISH





Departure





- Departure


Clinical Impression: 


 Atypical chest pain





Time of Disposition: 14:54


Disposition: Discharge to Home or Self Care


Departure Forms:  ED Discharge - Pt. Copy, Patient Portal Self Enrollment


Instructions:  DI for Chest Pain, DI for Atypical Chest Pain


Referrals: 


Abdirizak Gurrola MD [Primary Care Provider] - 1-2 Weeks


Home Medications: 


Ambulatory Orders





Celecoxib [Celebrex] 200 mg PO DAILY 17 


Clonazepam 0.5 mg PO PRN 17 


Donepezil Hydrochloride [Aricept] 10 mg PO BEDTIME 17 


Finasteride [Proscar] 5 mg PO DAILY 17 


Folic Acid-Vitamin B6-Vitamin [Folbee] 1 tab PO DAILY 17 


Glucosamine Hydrochloride [Glucosamine] 500 mg PO DAILY 17 


Misc Natural Products [Saw [place]] 1 cap PO DAILY 17 


Simvastatin [Zocor] 20 mg PO DAILY 17 


Apixaban [Eliquis] 2.5 mg PO DAILY 18 


Citalopram Hydrobromide [Citalopram] 20 mg PO DAILY 18 


Gabapentin 300 mg PO TID PRN 18 


Latanoprost [Latanoprost] 0.005 % BOTH_EYES BEDTIME 18 


Memantine HCl-Donepezil HCl [Namzaric 21-10 mg] 1 cap PO BEDTIME 18 


Metoprolol Tartrate 25 mg PO DAILY 18 


Timolol Maleate (Ophth) [Timolol Maleate] 0.5 % BOTH_EYES DAILY 18 


Tramadol HCl 50 mg PO Q6H PRN 18

## 2018-05-11 NOTE — RAD
EXAM DESCRIPTION: 



Chest,1 View



CLINICAL HISTORY: 



CHEST PAIN



COMPARISON: 



January 26, 2017



IMPRESSION: 



Single AP portable upright view of the chest shows mild

enlargement of the cardiac silhouette without pulmonary vascular

congestion.

Calcifications of the thoracic aortic arch are noted. Lungs are

normally aerated and clear.

No obvious pleural effusion or pneumothorax is seen.



Electronically signed by:  Zeus Gilliam MD  5/11/2018 11:32 AM CDT

Workstation: [id number]

## 2018-07-17 ENCOUNTER — HOSPITAL ENCOUNTER (OUTPATIENT)
Dept: HOSPITAL 39 - RAD | Age: 83
End: 2018-07-17
Attending: ORTHOPAEDIC SURGERY
Payer: MEDICARE

## 2018-07-17 DIAGNOSIS — M25.562: Primary | ICD-10-CM

## 2018-07-17 DIAGNOSIS — M25.552: ICD-10-CM

## 2018-07-17 NOTE — RAD
EXAM DESCRIPTION: Knee,Left 2 or More Views



CLINICAL HISTORY: 82 years, Male, PAIN IN LEFT KNEE



COMPARISON: None



TECHNIQUE: Four views of the left knee



FINDINGS:



No fracture or dislocation. Bones appear osteopenic or

osteoporotic with prominent trabecular pattern.



Narrowed appearance of medial compartment on frontal view.

Sclerosis and eburnation is seen in the medial compartment with

complete cartilage loss. Spurring is seen at the tibial spines

and lateral femoral condyle. Lateral view shows normal position

of the patella. Mild posterior patellar spurring and anterior

trochlear spurring with anterior patellar enthesopathy. Small to

moderate suprapatellar knee joint effusion is present. Normal

contour of quadriceps and patellar tendons.



No abnormal patellar tilt or subluxation  on patellar sunrise

view. Prominent anterior femoral trochlear spurring with mild

posterior patellar spurring.



IMPRESSION:



Degenerative changes as described.



Electronically signed by:  Pablo Nicole MD  7/17/2018 1:32

PM CDT Workstation: [id number]

## 2018-07-17 NOTE — RAD
EXAM DESCRIPTION: Pelvis



CLINICAL HISTORY: 82 years Male, PAIN IN LEFT HIP



COMPARISON: None.



FINDINGS: Degenerative changes are seen in the lower L-spine.

Transitional lumbosacral vertebrae is seen with

pseudoarticulation on the right. Sacrum appears intact. Normal SI

joints. Intact pelvic ring. Proximal femurs appear intact.



IMPRESSION:

Negative for fracture.



Electronically signed by:  Pablo Nicole MD  7/17/2018 1:32

PM CDT Workstation: [id number]

## 2018-10-01 ENCOUNTER — HOSPITAL ENCOUNTER (OUTPATIENT)
Dept: HOSPITAL 39 - RESP | Age: 83
End: 2018-10-01
Attending: ORTHOPAEDIC SURGERY
Payer: MEDICARE

## 2018-10-01 DIAGNOSIS — Z01.818: Primary | ICD-10-CM

## 2018-10-23 ENCOUNTER — HOSPITAL ENCOUNTER (INPATIENT)
Dept: HOSPITAL 39 - AMB | Age: 83
LOS: 4 days | Discharge: HOME | DRG: 470 | End: 2018-10-27
Attending: ORTHOPAEDIC SURGERY | Admitting: ORTHOPAEDIC SURGERY
Payer: MEDICARE

## 2018-10-23 DIAGNOSIS — Z66: ICD-10-CM

## 2018-10-23 DIAGNOSIS — M17.12: Primary | ICD-10-CM

## 2018-10-23 DIAGNOSIS — Z79.01: ICD-10-CM

## 2018-10-23 DIAGNOSIS — M54.16: ICD-10-CM

## 2018-10-23 DIAGNOSIS — N40.0: ICD-10-CM

## 2018-10-23 DIAGNOSIS — I48.2: ICD-10-CM

## 2018-10-23 DIAGNOSIS — E78.5: ICD-10-CM

## 2018-10-23 DIAGNOSIS — Z96.651: ICD-10-CM

## 2018-10-23 DIAGNOSIS — I10: ICD-10-CM

## 2018-10-23 DIAGNOSIS — Q60.0: ICD-10-CM

## 2018-10-23 PROCEDURE — 0SRD0J9 REPLACEMENT OF LEFT KNEE JOINT WITH SYNTHETIC SUBSTITUTE, CEMENTED, OPEN APPROACH: ICD-10-PCS | Performed by: ORTHOPAEDIC SURGERY

## 2018-10-23 RX ADMIN — VANCOMYCIN HYDROCHLORIDE SCH MLS/HR: 500 INJECTION, POWDER, LYOPHILIZED, FOR SOLUTION INTRAVENOUS at 20:27

## 2018-10-23 RX ADMIN — CEFAZOLIN SCH MLS/HR: 1 INJECTION, POWDER, FOR SOLUTION INTRAMUSCULAR; INTRAVENOUS at 23:29

## 2018-10-23 RX ADMIN — BUPIVACAINE ONE MG: 13.3 INJECTION, SUSPENSION, LIPOSOMAL INFILTRATION at 08:56

## 2018-10-23 RX ADMIN — CELECOXIB SCH: 100 CAPSULE ORAL at 17:46

## 2018-10-23 RX ADMIN — CEFAZOLIN SCH MLS/HR: 1 INJECTION, POWDER, FOR SOLUTION INTRAMUSCULAR; INTRAVENOUS at 17:42

## 2018-10-23 RX ADMIN — ENOXAPARIN SODIUM SCH MG: 30 INJECTION, SOLUTION INTRAVENOUS; SUBCUTANEOUS at 23:28

## 2018-10-23 RX ADMIN — DOCUSATE CALCIUM SCH MG: 240 CAPSULE, LIQUID FILLED ORAL at 20:28

## 2018-10-23 RX ADMIN — BUPIVACAINE HYDROCHLORIDE ONE ML: 5 INJECTION, SOLUTION EPIDURAL; INTRACAUDAL at 08:56

## 2018-10-23 RX ADMIN — VANCOMYCIN HYDROCHLORIDE ONE MG: 500 INJECTION, POWDER, LYOPHILIZED, FOR SOLUTION INTRAVENOUS at 08:55

## 2018-10-23 RX ADMIN — BUPIVACAINE ONE MG: 13.3 INJECTION, SUSPENSION, LIPOSOMAL INFILTRATION at 09:58

## 2018-10-23 RX ADMIN — VANCOMYCIN HYDROCHLORIDE ONE MG: 500 INJECTION, POWDER, LYOPHILIZED, FOR SOLUTION INTRAVENOUS at 10:00

## 2018-10-23 RX ADMIN — BUPIVACAINE HYDROCHLORIDE ONE ML: 5 INJECTION, SOLUTION EPIDURAL; INTRACAUDAL at 09:58

## 2018-10-23 RX ADMIN — Medication SCH: at 17:45

## 2018-10-23 NOTE — HP
CHIEF COMPLAINT:  Right knee pain.



HISTORY OF PRESENT ILLNESS:  Mr. Francisco is an 82-year-old male with a history of 
pain in the left knee.  Mr. Francisco has had pain going on for years and it has 
been getting progressively worse.  He has been managed with conservative 
measures, however, those have become ineffective.  Because of the failure of 
conservative measures, he has requested operative intervention.  After 
discussing the risks, benefits and alternatives to operative intervention, the 
patient has given informed consent for total knee arthroplasty.



PAST SURGICAL HISTORY:  

1.  Total knee arthroplasty.



MEDICATIONS:  

1.  Metoprolol.

2.  Simvastatin.

3.  Namzaric.

4.  Celebrex.

5.  Finasteride.

6.  Folbee.

7.  Gabapentin.

8.  Travatan.

9.  Tramadol.



ALLERGIES:  NO KNOWN DRUG ALLERGIES.



CODE STATUS:  DNR.



IMMUNIZATIONS:  Up to date.



FAMILY HISTORY:  None pertinent to today's complaint.



SOCIAL HISTORY:  The patient does not drink, smoke or use any illicit drugs.  



REVIEW OF SYSTEMS:  Negative except as indicated in the History of Present 
Illness.



PHYSICAL EXAMINATION:



VITAL SIGNS:  Blood pressure 104/60.  Pulse 87.  Height 5'9".  Weight 180 
pounds.



MENTAL STATUS:  The patient is awake, alert, and is able to give a good history 
and participate in the physical.  The patient is oriented to person, place and 
time.



SKIN:  Normal tone and turgor.



HEENT:  Normocephalic, atraumatic.  Pupils equal, round and reactive.  Mucosal 
membranes are moist.



NECK:  Normal range of motion.  No thyromegaly, no lymphadenopathy. 



CHEST:  Normal respiratory excursion.



CARDIAC:  Regular rate and rhythm.  No murmurs, rubs or gallops.



MUSCULOSKELETAL:  Bilateral upper extremities show full active range of motion 
without pain.  He has intact sensation in both extremities and they are warm 
and well perfused.  He has no deformity.  



The right lower extremity shows full range of motion of the hip.  He has intact 
sensation in the extremity.  He has warm and well perfused extremity.  No varus/
valgus or anterior/posterior laxity.  He has well-healed wound from previous 
total knee arthroplasty on that side.  



The left side shows full range of motion of the hip without significant pain.  
Sensation is intact and it is warm and well perfused.  The knee has crepitus 
throughout the range of motion.  He has no varus/valgus or anterior/posterior 
laxity.  He has slight varus deformity to the extremity.  



IMAGING:  X-rays show endstage arthritis of the knee.



ASSESSMENT:

1.  Osteoarthritis of the knee, failed conservative measures.



PLAN: The plan at this point is for total knee arthroplasty.  We have discussed 
the risks, benefits, and alternatives to that and the patient has given 
informed consent.



#[id number]
[facility]

## 2018-10-23 NOTE — RAD
EXAM DESCRIPTION: 

Knee,Left 2 or More Views



CLINICAL HISTORY: 

TKA



COMPARISON: 

None Available.



TECHNIQUE/FINDINGS: 

AP LATERAL crosstable left knee.



IMPRESSION: 

Left total knee arthroplasty. Components in customary position

and near-anatomic alignment. Bones surrounding the components is

unremarkable. Typical postsurgical soft tissue changes. No

abnormal radiodense objects in the soft tissues or joint spaces.



Electronically signed by:  Ellis Amaya MD  10/23/2018 3:29 PM

CDT Workstation: [id number]

## 2018-10-23 NOTE — CONS
DATE OF CONSULTATION:  10/23/18



SUPERVISING PHYSICIAN:  Antony Carvalho M.D.



CHIEF COMPLAINT:  Left knee pain.



HISTORY OF PRESENT ILLNESS:  This is an 82 year-old male patient who has had a 
long history of left knee arthritis.  He has tried conservative measures and is 
unable to get relief from the pain, so he requested Dr. Marquis Whiteside, orthopedic 
surgeon for operative intervention for left total knee arthroplasty which was 
done today.  He had no problems intraoperatively and I am seeing him 
postoperatively on the Medical/Surgical floor.



PAST MEDICAL HISTORY: 

1.   Atrial fibrillation.

2.   Hyperlipidemia.

3.   Lumbar radiculopathy.

4.   Hypertension.

5.   Born with 1 kidney.

6.   Osteoarthritis.

7.   Benign prostatic hypertrophy.

8.   Spinal stenosis.



PAST SURGICAL HISTORY:  

1.   Back surgery.

2.   Total knee arthroplasty of the right knee.

3.   Cardiac catheterization.



OUTPATIENT MEDICATIONS:  

1.   Eliquis.

2.   Folic acid.

3.   Glucosamine.

4.   Tramadol.

5.   Citalopram.

6.   Finasteride.

7.   Gabapentin.

8.   Latanoprost ophthalmic.

9.   Metoprolol.

10. Simvastatin.

11. Timolol ophthalmic.



ALLERGIES:   NO KNOWN DRUG ALLERGIES.



FAMILY HISTORY:  Positive for myocardial infarction, colon cancer, chronic 
obstructive pulmonary disease and type 2 diabetes.



SOCIAL HISTORY:  He is .  He lives in [place].  He has 2 children.  He 
has never smoked.  He drinks alcohol very infrequently.  Denies any illicit 
drug use.



REVIEW OF SYSTEMS:  Negative except as per History of Present Illness.



PHYSICAL EXAMINATION: 



VITAL SIGNS:  He is afebrile, pulse rate 88, blood pressure 103/64, respiratory 
rate 16, O2 sat is 98% on 2 liters nasal cannula.



GENERAL:  This is an 82 year-old male patient lying in his hospital bed.  He is 
in no acute distress.



HEENT:  Normocephalic and atraumatic.  Pupils are equal and reactive.  
Oropharynx is clear.



NECK:  Supple without mass.



RESPIRATORY:  Essentially clear to auscultation bilaterally.



HEART:  Regular rate and rhythm.



GASTROINTESTINAL:  Abdomen is soft, nondistended, non-tender.  Bowel sounds are 
positive.



EXTREMITIES:  His left leg is in a CPM machine.  He has an Iceman in place.  
His dressing is dry and intact.  Bilateral pedal pulses are palpable at +2.



NEUROLOGIC:  He is awake, alert and oriented times three.



LABORATORY:  WBCs are 4.4 with hemoglobin 13.5, hematocrit 41.1.  Electrolytes 
are basically within normal limits with the exception of his anion gap is 
slightly low at 10.8.  All other labs and films have been reviewed via the EMR.



IMPRESSION:

1.   Osteoarthritis of the left knee status post left total knee arthroplasty 
performed by

      Dr. Marquis Whiteside, orthopedic surgeon.  Postoperative day #0.

2.   History of atrial fibrillation.

3.   Hypertension.

4.   Hyperlipidemia.

5.   Benign prostatic hypertrophy.

6.   Born with 1 kidney

7.   Lumbar radiculopathy.



PLAN:  We will continue present supportive care.  Orthopedic issues will be per 
Dr. Marquis Whiteside, orthopedic surgeon.  He will begin his physical therapy 
tomorrow for strengthening and conditioning.  They did inquire about Swing Bed 
status after his acute care stay is completed.  I have restarted his home 
medications.  I have encouraged good pulmonary hygiene.  I have also ordered 
Benadryl for itching.  We will continue to monitor him closely and follow as 
needed.  Dr. Carvalho is the collaborating physician available for consultation.



#[id number]
[facility]

## 2018-10-24 RX ADMIN — CITALOPRAM HYDROBROMIDE SCH MG: 20 TABLET ORAL at 08:44

## 2018-10-24 RX ADMIN — CYCLOBENZAPRINE HYDROCHLORIDE PRN MG: 10 TABLET, FILM COATED ORAL at 07:14

## 2018-10-24 RX ADMIN — DOCUSATE CALCIUM SCH MG: 240 CAPSULE, LIQUID FILLED ORAL at 20:51

## 2018-10-24 RX ADMIN — Medication SCH MG: at 09:34

## 2018-10-24 RX ADMIN — CEFAZOLIN SCH MLS/HR: 1 INJECTION, POWDER, FOR SOLUTION INTRAMUSCULAR; INTRAVENOUS at 08:39

## 2018-10-24 RX ADMIN — LATANOPROST SCH DROP: 50 SOLUTION OPHTHALMIC at 20:52

## 2018-10-24 RX ADMIN — SIMVASTATIN SCH MG: 20 TABLET, FILM COATED ORAL at 09:34

## 2018-10-24 RX ADMIN — VANCOMYCIN HYDROCHLORIDE SCH MLS/HR: 500 INJECTION, POWDER, LYOPHILIZED, FOR SOLUTION INTRAVENOUS at 08:43

## 2018-10-24 RX ADMIN — METOPROLOL SUCCINATE SCH MG: 25 TABLET, EXTENDED RELEASE ORAL at 08:45

## 2018-10-24 RX ADMIN — ENOXAPARIN SODIUM SCH MG: 30 INJECTION, SOLUTION INTRAVENOUS; SUBCUTANEOUS at 20:51

## 2018-10-24 RX ADMIN — ENOXAPARIN SODIUM SCH MG: 30 INJECTION, SOLUTION INTRAVENOUS; SUBCUTANEOUS at 08:47

## 2018-10-24 RX ADMIN — HYDROCODONE BITARTRATE AND ACETAMINOPHEN PRN EA: 5; 325 TABLET ORAL at 14:31

## 2018-10-24 RX ADMIN — HYDROCODONE BITARTRATE AND ACETAMINOPHEN PRN EA: 5; 325 TABLET ORAL at 06:03

## 2018-10-24 RX ADMIN — CELECOXIB SCH MG: 100 CAPSULE ORAL at 18:04

## 2018-10-24 RX ADMIN — FINASTERIDE SCH MG: 5 TABLET, FILM COATED ORAL at 09:32

## 2018-10-24 RX ADMIN — HYDROCODONE BITARTRATE AND ACETAMINOPHEN PRN EA: 5; 325 TABLET ORAL at 20:53

## 2018-10-24 RX ADMIN — CELECOXIB SCH MG: 100 CAPSULE ORAL at 07:34

## 2018-10-24 NOTE — OP
DATE OF PROCEDURE:  10/23/18



PREOPERATIVE DIAGNOSIS: 

1.  Osteoarthritis of the knee.



POSTOPERATIVE DIAGNOSIS: 

1.  Osteoarthritis of the knee.



PROCEDURE: 

1.  Total knee arthroplasty.



SURGEON:  Marquis Whiteside MD.



ASSISTANT:  Ellis Robins CST, SA-C.



ANESTHESIA:  General anesthesia.



COMPLICATIONS:  None.



FINDINGS:   Severe osteoarthritis of the knee.



INDICATION:  Mr. Francisco has a history of severe knee pain that has been getting 
progressively worse and has been refractory to conservative measures.  Because 
of his ongoing pain and refractory nature, he has requested operative 
intervention.  After discussing the risks, benefits and alternatives to that, 
the patient has given informed consent for total knee arthroplasty.



PROCEDURE:  The patient was brought to the Operating Room and placed in supine 
position.  General anesthesia was induced and the patient's leg was sterilely 
prepped and draped.  Following prepping and draping, the distal femur was 
exposed and using an intramedullary guide, the distal femoral cut was made.  
The appropriate sized cutting block was measured, pinned into place, and the 
anterior, posterior, and chamfer cuts were made.  The ACL was transected and 
the tibia was subluxed.  Both the medial and lateral menisci were removed.  An 
intramedullary guide was used to make the proximal tibial cut.  The appropriate 
sized base plate was placed and a trial polyethylene was placed.  The trial 
femur was placed, the knee was reduced, and the knee was taken through a range 
of motion.  The knee was stable in anterior, posterior, varus and valgus 
stress.  The patella tracked anatomically without evidence of subluxation or 
dislocation.  After trialing, the trial components were removed and the bony 
surfaces were thoroughly irrigated with saline.  Following irrigation, the 
surfaces were dried and the final components were cemented into place.  The 
excess cement was removed and the remaining cement was allowed to cure.  The 
knee was again taken through a range of motion to confirm stability.  The wound 
was then irrigated with saline and closure was performed using PDS to 
approximate the arthrotomy followed by closure of the subcutaneous tissues with 
a combination of running and interrupted Monocryl sutures.  Sterile dressing 
was placed.  The patient was awoken from anesthesia and taken to Recovery.



POSTOPERATIVE PLAN:  The patient will be weight-bearing as tolerated on 
postoperative day 1.



COMPONENTS:  "[employer]" Triathlon knee, size 6 femur, size 5 tibia, 9 mm insert.



#[id number]
[facility]

## 2018-10-24 NOTE — PN
DATE:  10/24/18



SUPERVISING PHYSICIAN:  Antony Carvalho M.D.



SUBJECTIVE:  The patient is sitting up in his chair in his hospital room.  His 
wife is at the bedside.  He has no complaints of shortness of breath, nausea, 
vomiting, diarrhea or chest pain.  He felt like his physical therapy went well 
today.  Although he does get somewhat tired on the CPM machine he feels like he 
is progressing well.



OBJECTIVE: VITAL SIGNS: He is afebrile, heart rate 80, blood pressure 106/59, 
respiratory rate 18, O2 sat 92% on 1 liter nasal cannula.  RESPIRATORY: 
Essentially clear to auscultation bilaterally.  CARDIAC: Regular rate and 
rhythm.  EXTREMITIES: He has a dressing to his left knee that is dry and 
intact.  Bilateral pedal pulses are palpable at +2.  NEUROLOGIC: He is awake, 
alert and oriented times three.



LABORATORY:  Hemoglobin 11.1, hematocrit 34.  All other labs and films have 
been reviewed via the EMR.



ASSESSMENT: 

1.   Osteoarthritis of the left knee status post left total knee arthroplasty 
performed by

      Dr. Marquis Whiteside, orthopedic surgeon.  Postoperative day #1.

2.   History of atrial fibrillation.

3.   Hypertension.

4.   Hyperlipidemia.

5.   Benign prostatic hypertrophy.

6.   Born with 1 kidney

7.   Lumbar radiculopathy.



PLAN:  We will continue present supportive care.  Orthopedic issues will be per 
Dr. Marquis Whiteside.  He will continue with his physical therapy for strengthening 
and conditioning.  Family is undecided whether he will have home health 
physical therapy or if he will do Swing Bed.  We will need to discuss those 
options with him tomorrow.  I have also ordered an H&H for in the morning and 
will continue to monitor him closely, and follow as needed.  Dr. Carvalho is the 
collaborating physician available for consultation.



#[id number]
[facility]

## 2018-10-24 NOTE — PN
DATE:  10/23/18



POSTOPERATIVE CHECK



SUBJECTIVE:  Mr. Francisco subjectively is doing well and he is not having any pain 
today.



OBJECTIVE:  He is afebrile.  Vital signs are stable.  Dressing is clean, dry 
and intact.



ASSESSMENT: 

1.   Status post total knee arthroplasty.



PLAN:  The plan at this point is to begin weightbearing as tolerated on 
postoperative day 1.



#[id number]
[facility]

## 2018-10-24 NOTE — PN
DATE:  10/24/18



SUBJECTIVE:  He is doing well and he still has great pain control.



OBJECTIVE:  He is afebrile.  Vital signs are stable.  Dressing is clean, dry 
and intact.



ASSESSMENT: 

1.   Status post total knee arthroplasty.



PLAN:  The plan is to begin weightbearing as tolerated.  Today will increase 
his CPM as tolerated as well.



#[id number]
[facility]

## 2018-10-25 RX ADMIN — ENOXAPARIN SODIUM SCH MG: 30 INJECTION, SOLUTION INTRAVENOUS; SUBCUTANEOUS at 08:17

## 2018-10-25 RX ADMIN — SIMVASTATIN SCH MG: 20 TABLET, FILM COATED ORAL at 08:17

## 2018-10-25 RX ADMIN — HYDROCODONE BITARTRATE AND ACETAMINOPHEN PRN EA: 5; 325 TABLET ORAL at 19:35

## 2018-10-25 RX ADMIN — CITALOPRAM HYDROBROMIDE SCH MG: 20 TABLET ORAL at 08:17

## 2018-10-25 RX ADMIN — HYDROCODONE BITARTRATE AND ACETAMINOPHEN PRN EA: 5; 325 TABLET ORAL at 05:13

## 2018-10-25 RX ADMIN — HYDROCODONE BITARTRATE AND ACETAMINOPHEN PRN EA: 5; 325 TABLET ORAL at 10:39

## 2018-10-25 RX ADMIN — METOPROLOL SUCCINATE SCH MG: 25 TABLET, EXTENDED RELEASE ORAL at 08:18

## 2018-10-25 RX ADMIN — Medication SCH MG: at 08:17

## 2018-10-25 RX ADMIN — Medication SCH ML: at 21:08

## 2018-10-25 RX ADMIN — FINASTERIDE SCH MG: 5 TABLET, FILM COATED ORAL at 08:17

## 2018-10-25 RX ADMIN — TIMOLOL MALEATE SCH: 5 SOLUTION OPHTHALMIC at 08:24

## 2018-10-25 RX ADMIN — LATANOPROST SCH DROP: 50 SOLUTION OPHTHALMIC at 21:08

## 2018-10-25 RX ADMIN — CYCLOBENZAPRINE HYDROCHLORIDE PRN MG: 10 TABLET, FILM COATED ORAL at 21:09

## 2018-10-25 RX ADMIN — DOCUSATE CALCIUM SCH MG: 240 CAPSULE, LIQUID FILLED ORAL at 21:08

## 2018-10-25 RX ADMIN — Medication SCH ML: at 08:23

## 2018-10-25 RX ADMIN — CELECOXIB SCH MG: 100 CAPSULE ORAL at 07:22

## 2018-10-25 RX ADMIN — ENOXAPARIN SODIUM SCH MG: 30 INJECTION, SOLUTION INTRAVENOUS; SUBCUTANEOUS at 21:07

## 2018-10-25 RX ADMIN — CELECOXIB SCH MG: 100 CAPSULE ORAL at 17:02

## 2018-10-25 NOTE — PN
SUPERVISING PHYSICIAN:  Antony Carvalho M.D.



DATE:  10/25/18



SUBJECTIVE:  The patient is lying in his bed.  The son is at the bedside.  He 
is in the CPM machine.  He felt like he had a good night.  Yesterday was a 
little rough, but today he feels much better.  He felt like his physical 
therapy went well.  He has no complaints of shortness of breath, nausea, 
vomiting, diarrhea or constipation.



OBJECTIVE: 

VITAL SIGNS:  Temperature 97.5.  Blood pressure 114/68.  Right 18.  O2 
saturation 97%.  

RESPIRATORY: Essentially clear to auscultation bilaterally.  

CARDIAC: Regular rate and rhythm.  

EXTREMITIES:  The dressing to his left knee is dry and intact.  His left leg is 
in the CPM machine.  There is very minimal edema at the incision site and no 
erythema.  Bilateral pedal pulses are palpable at +2.  

GASTROINTESTINAL:  Abdomen soft, nondistended, nontender.  Bowel sounds are 
positive.

NEUROLOGIC: He is awake, alert and oriented times three.



LABORATORY:  Hemoglobin and hematocrit are stable at 10.8 and 32.4.  All other 
labs and films have been reviewed via the EMR.



ASSESSMENT: 

1.   Osteoarthritis of the left knee status post left total knee arthroplasty 
performed by

      Dr. Marquis Whiteside, orthopedic surgeon, postoperative day #2.

2.   History of atrial fibrillation, stable.

3.   Hypertension, stable.

4.   Hyperlipidemia, stable.

5.   Benign prostatic hypertrophy, stable.

6.   Born with one kidney.

7.   Lumbar radiculopathy.



PLAN:  We will continue present supportive care.  Orthopedic issues will be per 
Dr. Marquis Whiteside, orthopedic surgeon.  He will continue with his physical 
therapy for strengthening and conditioning.  At this time, the patient and 
family have not decided if they want home health with physical therapy or if 
they are going to go to Swing Bed.  The family is in discussion about that at 
this time.  Otherwise, we will continue to monitor him closely and follow as 
needed.  Dr. Carvalho is the collaborating physician and available for 
consultation.



#[id number]
[facility]

## 2018-10-25 NOTE — PN
DATE:  10/25/18



SUBJECTIVE:  Mr. Francisco is doing well today and has pain that seems to be well 
managed.



OBJECTIVE:  Afebrile.  Vital signs are stable.  Wound is clean.  There are no 
signs or symptoms of infection.



ASSESSMENT:  Status post total knee arthroplasty.



PLAN:  The plan is for him to continue weightbearing as tolerated.  He is on 
track to potentially be discharged tomorrow.  



#[id number]
[facility]

## 2018-10-26 RX ADMIN — Medication SCH MG: at 08:17

## 2018-10-26 RX ADMIN — LATANOPROST SCH DROP: 50 SOLUTION OPHTHALMIC at 20:58

## 2018-10-26 RX ADMIN — TIMOLOL MALEATE SCH: 5 SOLUTION OPHTHALMIC at 08:21

## 2018-10-26 RX ADMIN — Medication SCH ML: at 20:57

## 2018-10-26 RX ADMIN — ENOXAPARIN SODIUM SCH MG: 30 INJECTION, SOLUTION INTRAVENOUS; SUBCUTANEOUS at 08:18

## 2018-10-26 RX ADMIN — FINASTERIDE SCH MG: 5 TABLET, FILM COATED ORAL at 08:18

## 2018-10-26 RX ADMIN — SIMVASTATIN SCH MG: 20 TABLET, FILM COATED ORAL at 08:18

## 2018-10-26 RX ADMIN — CELECOXIB SCH MG: 100 CAPSULE ORAL at 16:54

## 2018-10-26 RX ADMIN — Medication SCH ML: at 08:18

## 2018-10-26 RX ADMIN — CITALOPRAM HYDROBROMIDE SCH MG: 20 TABLET ORAL at 08:18

## 2018-10-26 RX ADMIN — DOCUSATE CALCIUM SCH MG: 240 CAPSULE, LIQUID FILLED ORAL at 20:57

## 2018-10-26 RX ADMIN — HYDROCODONE BITARTRATE AND ACETAMINOPHEN PRN EA: 5; 325 TABLET ORAL at 08:23

## 2018-10-26 RX ADMIN — CELECOXIB SCH MG: 100 CAPSULE ORAL at 07:24

## 2018-10-26 RX ADMIN — METOPROLOL SUCCINATE SCH MG: 25 TABLET, EXTENDED RELEASE ORAL at 08:17

## 2018-10-26 RX ADMIN — ENOXAPARIN SODIUM SCH MG: 30 INJECTION, SOLUTION INTRAVENOUS; SUBCUTANEOUS at 20:58

## 2018-10-26 NOTE — PN
DATE:  10/26/18



SUBJECTIVE:  Mr. Francisco is improved and is up to a chair with his knee bent at 90 
degrees.



OBJECTIVE:  Afebrile.  Vital signs stable.  Wound is clean.  There are no signs 
or symptoms of infection.



ASSESSMENT:  Status post total knee arthroplasty.



PLAN:  The plan at this point is for discharge.  He will be doing outpatient 
physical therapy.  He will return to see us in 10 days to 2 weeks, but 
instructed to return should any change in his condition occur.



#[id number]
[facility]

## 2018-10-26 NOTE — PN
SUPERVISING PHYSICIAN:  Antony Carvalho MD



DATE:  10/26/18



SUBJECTIVE:  The patient notes he is doing well this morning.  He has been 
afebrile.  He has had no complaints.  He is able to participate with physical 
therapy.



OBJECTIVE:  

VITAL SIGNS:  Temperature 98.4.  Pulse 91.  Blood pressure 129/75.  Respiratory 
rate 18.  Saturation 95% on room air at rest.  I&Os show negative balance of 
1060 with 1290 in, 2350 out.  He has had one bowel movement.  Weight 84.5 kg.  

GENERAL:  The patient is resting comfortably, sitting in a chair in no 
distress.  He is alert.

CHEST:  Lungs clear to auscultation bilaterally.  

HEART:  Regular rate and rhythm.  

ABDOMEN:  Soft, nontender.  Positive bowel sounds.  

EXTREMITIES:  Dressing in place over the left knee is dry and intact.  No signs 
of infection.  There is some mild erythema and some ecchymotic areas on the 
lateral aspect with bilateral pedal pulses 2+.

NEUROLOGIC: Alert and oriented times three.  



LABORATORY:  No laboratory to review today.



ASSESSMENT: 

1.   Osteoarthritis of the left knee status post left total knee arthroplasty 
performed by

      Dr. Marquis Whiteside, orthopedic surgeon, postoperative day #3.

2.   History of chronic atrial fibrillation, stable.

3.   Hypertension, stable.

4.   Hyperlipidemia, stable.

5.   Chronic benign prostatic hypertrophy without any complications.

6.   Lumbar radiculopathy.



PLAN:  I will continue to follow the patient as he progresses through his 
physical therapy and strengthening with anticipation of hopefully being able to 
discharge either later today or tomorrow.  Discharge plan is to do either home 
health or possibly [facility].  Until discharge, we will continue to monitor the 
patient closely and treat as needed.  



#[id number]
[facility]

## 2018-10-27 VITALS — SYSTOLIC BLOOD PRESSURE: 125 MMHG | DIASTOLIC BLOOD PRESSURE: 71 MMHG

## 2018-10-27 VITALS — OXYGEN SATURATION: 96 %

## 2018-10-27 VITALS — TEMPERATURE: 98.5 F

## 2018-10-27 RX ADMIN — CELECOXIB SCH MG: 100 CAPSULE ORAL at 07:34

## 2018-10-27 RX ADMIN — CITALOPRAM HYDROBROMIDE SCH MG: 20 TABLET ORAL at 08:46

## 2018-10-27 RX ADMIN — Medication SCH MG: at 08:46

## 2018-10-27 RX ADMIN — FINASTERIDE SCH MG: 5 TABLET, FILM COATED ORAL at 08:46

## 2018-10-27 RX ADMIN — ENOXAPARIN SODIUM SCH MG: 30 INJECTION, SOLUTION INTRAVENOUS; SUBCUTANEOUS at 08:46

## 2018-10-27 RX ADMIN — SIMVASTATIN SCH MG: 20 TABLET, FILM COATED ORAL at 08:46

## 2018-10-27 RX ADMIN — METOPROLOL SUCCINATE SCH MG: 25 TABLET, EXTENDED RELEASE ORAL at 08:46

## 2018-10-27 RX ADMIN — Medication SCH ML: at 08:47

## 2018-10-27 NOTE — DS
SUPERVISING PHYSICIAN:  Antony Carvalho M.D.



ADMISSION DIAGNOSIS:

1.   Osteoarthritis of the left knee status post left knee arthroplasty, 
postoperative day #0

      for symptom control having failed to respond to outpatient management 
with surgery

      performed by Dr. Marquis Whiteside, orthopedic surgeon.

2.   History of chronic atrial fibrillation with controlled ventricular rate 
and on chronic

      anticoagulation with Eliquis.

3.   Hypertension.

4.   Hyperlipidemia.

5.   Benign prostatic hypertrophy.

6.   Lumbar radiculopathy.



DISCHARGE DIAGNOSIS: 

1.   Osteoarthritis of the left knee status post left knee arthroplasty, 
postoperative day #4

      with surgery performed by Dr. Marquis Whiteside, orthopedic surgeon.

2.   Chronic atrial fibrillation showing to be stable on chronic Eliquis.

3.   Hypertension, stable.

4.   Hyperlipidemia, chronic.

5.   Chronic benign prostatic hypertrophy without any complications.

6.   Lumbar radiculopathy.



REASON FOR HOSPITALIZATION:  Mr. Francisco is an 82 year-old  male patient 
who has a long history of arthritis in the left knee.  He has tried multiple 
measures that were conservative treatments as an outpatient but was unable to 
get any significant relief.  He then requested elective intervention for total 
left knee arthroplasty.  The patient was admitted on 10/23/18 for elective 
total left knee arthroplasty.  He had no intraoperative complications and was 
followed postoperatively and was in stable condition on admission.



LABORATORY:  Preoperative CBC showed hemoglobin 13.5, hematocrit 41.1, 
platelets 164,000.  Discharge H&H showed 10.8 hemoglobin and 32.4 hematocrit.  
Chemistries preoperatively showed normal electrolytes.  No additional 
radiographic studies.



CONSULTATIONS:  Hospitalist services for postoperative management.  Please see 
that note for details.



PROCEDURES:  Total left knee arthroplasty for severe arthritis of the knee 
performed by Dr. Marquis Whiteside.  Please see his Operative Report.



HOSPITAL COURSE:  Mr. Francisco was admitted on 10/23/18 for elective total left 
knee arthroplasty.  He had no complications intraoperatively.  He was followed 
postoperatively and progressed well with his physical therapy.  He did have 1 
episode where he slid down in the bathroom but no complications or injuries 
were noted.  He was able to progress with Physical Therapy.  On day of 
discharge it was felt that he was strong enough to discharge to continue with 
outpatient management with home health.



PHYSICAL EXAMINATION:



VITAL SIGNS:  Temperature 98.5, pulse 104, blood pressure 125/71, respirations 
18, satting 96% on room air.



GENERAL:  The patient was very pleasant, alert and in no distress.  Very 
comfortable.



CHEST:  Lungs were clear to auscultation.  



HEART:  Slightly irregular rate and rhythm.



ABDOMEN:  Obese but soft, non-tender.  Positive bowel sounds.



EXTREMITIES:  Left knee had an island dressing in place which is clean and dry 
with no signs of infection.  Distally pulses were strong with capillary refill 
brisk.  



NEUROLOGIC:  He is alert and oriented times three.



PLAN:  Mr. Francisco was discharged on 10/27/18 with instructions to followup with 
Dr. Whiteside on 11/09/18 at 9:45 AM.  Instructions to have diet resumed, usual diet.
  Activity is to ambulate only with a walker as per Physical Therapy and to 
increase activity as tolerated.  May shower but no tub baths.  He was to 
continue with [facility] for continued home care and physical 
therapy.  He was given instructions to return to the hospital or call Dr. Whiteside 
if he had any concerning symptoms.  New prescriptions at discharge included 
pain management with:



1.   Norco 5/325 one every 4 hours as needed with prescription written by Dr. Whiteside.



All other medications were continued as prior to hospitalization, including 
Eliquis which he was on at 2.5 mg b.i.d. chronically.  



DISPOSITION:  The patient was discharged home with equipment to include shower 
chair, bedside commode and a walker.  He was escorted with his family.  
Condition at discharge was stable and improved.



#[id number]
[facility]

## 2019-01-29 ENCOUNTER — HOSPITAL ENCOUNTER (OUTPATIENT)
Dept: HOSPITAL 39 - GMAE | Age: 84
End: 2019-01-29
Attending: FAMILY MEDICINE
Payer: MEDICARE

## 2019-01-29 DIAGNOSIS — M25.562: Primary | ICD-10-CM

## 2019-07-17 ENCOUNTER — HOSPITAL ENCOUNTER (OUTPATIENT)
Dept: HOSPITAL 39 - GMAE | Age: 84
End: 2019-07-17
Attending: FAMILY MEDICINE
Payer: MEDICARE

## 2019-07-17 DIAGNOSIS — Z12.5: ICD-10-CM

## 2019-07-17 DIAGNOSIS — E03.9: Primary | ICD-10-CM

## 2019-07-17 DIAGNOSIS — E78.2: ICD-10-CM

## 2019-07-17 DIAGNOSIS — I10: ICD-10-CM

## 2019-07-17 PROCEDURE — 84481 FREE ASSAY (FT-3): CPT

## 2019-07-17 PROCEDURE — 84443 ASSAY THYROID STIM HORMONE: CPT

## 2019-07-17 PROCEDURE — 84439 ASSAY OF FREE THYROXINE: CPT

## 2020-01-27 ENCOUNTER — HOSPITAL ENCOUNTER (OUTPATIENT)
Dept: HOSPITAL 39 - LAB.O | Age: 85
End: 2020-01-27
Attending: FAMILY MEDICINE
Payer: OTHER GOVERNMENT

## 2020-01-27 DIAGNOSIS — I10: Primary | ICD-10-CM

## 2020-01-27 DIAGNOSIS — R53.83: ICD-10-CM

## 2020-01-27 DIAGNOSIS — E78.5: ICD-10-CM

## 2020-05-14 ENCOUNTER — HOSPITAL ENCOUNTER (OUTPATIENT)
Dept: HOSPITAL 39 - GMAE | Age: 85
End: 2020-05-14
Attending: FAMILY MEDICINE
Payer: MEDICARE

## 2020-05-14 DIAGNOSIS — M62.81: Primary | ICD-10-CM

## 2020-10-07 ENCOUNTER — HOSPITAL ENCOUNTER (OUTPATIENT)
Dept: HOSPITAL 39 - GMAE | Age: 85
End: 2020-10-07
Attending: FAMILY MEDICINE
Payer: MEDICARE

## 2020-10-07 DIAGNOSIS — I10: ICD-10-CM

## 2020-10-07 DIAGNOSIS — E78.2: ICD-10-CM

## 2020-10-07 DIAGNOSIS — E03.9: Primary | ICD-10-CM

## 2020-11-09 ENCOUNTER — HOSPITAL ENCOUNTER (OUTPATIENT)
Dept: HOSPITAL 39 - AMB | Age: 85
Discharge: HOME | End: 2020-11-09
Attending: OPHTHALMOLOGY
Payer: MEDICARE

## 2020-11-09 VITALS — DIASTOLIC BLOOD PRESSURE: 70 MMHG | SYSTOLIC BLOOD PRESSURE: 146 MMHG | TEMPERATURE: 97.6 F | OXYGEN SATURATION: 97 %

## 2020-11-09 DIAGNOSIS — H25.11: Primary | ICD-10-CM

## 2020-11-09 DIAGNOSIS — I10: ICD-10-CM

## 2020-11-09 DIAGNOSIS — Z79.899: ICD-10-CM

## 2020-11-09 DIAGNOSIS — Z79.01: ICD-10-CM

## 2020-11-09 PROCEDURE — 00142 ANES PX ON EYE LENS SURGERY: CPT

## 2020-11-09 PROCEDURE — 66984 XCAPSL CTRC RMVL W/O ECP: CPT

## 2020-11-09 RX ADMIN — TOBRAMYCIN ONE DROP: 3 SOLUTION/ DROPS OPHTHALMIC at 06:45

## 2020-11-09 RX ADMIN — TOBRAMYCIN ONE DROP: 3 SOLUTION/ DROPS OPHTHALMIC at 07:50

## 2020-11-09 RX ADMIN — Medication ONE DROP: at 06:46

## 2020-11-09 RX ADMIN — PROPARACAINE HYDROCHLORIDE ONE DROP: 5 SOLUTION/ DROPS OPHTHALMIC at 07:22

## 2020-11-09 RX ADMIN — Medication ONE DROP: at 07:50

## 2020-11-09 RX ADMIN — PROPARACAINE HYDROCHLORIDE ONE DROP: 5 SOLUTION/ DROPS OPHTHALMIC at 06:44

## 2020-12-07 ENCOUNTER — HOSPITAL ENCOUNTER (OUTPATIENT)
Dept: HOSPITAL 39 - AMB | Age: 85
Discharge: HOME | End: 2020-12-07
Attending: OPHTHALMOLOGY
Payer: MEDICARE

## 2020-12-07 DIAGNOSIS — H25.12: Primary | ICD-10-CM

## 2020-12-07 DIAGNOSIS — Z79.01: ICD-10-CM

## 2020-12-07 DIAGNOSIS — I10: ICD-10-CM

## 2020-12-07 DIAGNOSIS — Z79.899: ICD-10-CM

## 2020-12-07 PROCEDURE — 00142 ANES PX ON EYE LENS SURGERY: CPT

## 2020-12-07 PROCEDURE — 66984 XCAPSL CTRC RMVL W/O ECP: CPT

## 2020-12-24 ENCOUNTER — HOSPITAL ENCOUNTER (OUTPATIENT)
Dept: HOSPITAL 39 - YCFC.O | Age: 85
End: 2020-12-24
Attending: FAMILY MEDICINE
Payer: MEDICARE

## 2020-12-24 DIAGNOSIS — E78.5: ICD-10-CM

## 2020-12-24 DIAGNOSIS — D50.9: ICD-10-CM

## 2020-12-24 DIAGNOSIS — R53.83: ICD-10-CM

## 2020-12-24 DIAGNOSIS — I10: Primary | ICD-10-CM

## 2020-12-24 DIAGNOSIS — D64.89: ICD-10-CM
